# Patient Record
Sex: FEMALE | Race: OTHER | Employment: FULL TIME | URBAN - METROPOLITAN AREA
[De-identification: names, ages, dates, MRNs, and addresses within clinical notes are randomized per-mention and may not be internally consistent; named-entity substitution may affect disease eponyms.]

---

## 2023-06-21 ENCOUNTER — APPOINTMENT (OUTPATIENT)
Dept: GENERAL RADIOLOGY | Age: 33
End: 2023-06-21
Attending: EMERGENCY MEDICINE
Payer: COMMERCIAL

## 2023-06-21 ENCOUNTER — HOSPITAL ENCOUNTER (EMERGENCY)
Age: 33
Discharge: HOME | End: 2023-06-21
Attending: EMERGENCY MEDICINE
Payer: COMMERCIAL

## 2023-06-21 VITALS
SYSTOLIC BLOOD PRESSURE: 118 MMHG | BODY MASS INDEX: 39.31 KG/M2 | DIASTOLIC BLOOD PRESSURE: 68 MMHG | OXYGEN SATURATION: 98 % | RESPIRATION RATE: 16 BRPM | HEIGHT: 59 IN | HEART RATE: 72 BPM | WEIGHT: 195 LBS | TEMPERATURE: 98 F

## 2023-06-21 DIAGNOSIS — R07.89 CHEST PAIN, ATYPICAL: Primary | ICD-10-CM

## 2023-06-21 LAB
ALBUMIN SERPL-MCNC: 3.5 G/DL (ref 3.4–5)
ALBUMIN/GLOB SERPL: 0.9 {RATIO} (ref 1–2)
ALP LIVER SERPL-CCNC: 60 U/L
ALT SERPL-CCNC: 49 U/L
ANION GAP SERPL CALC-SCNC: 3 MMOL/L (ref 0–18)
AST SERPL-CCNC: 23 U/L (ref 15–37)
BASOPHILS # BLD AUTO: 0.05 X10(3) UL (ref 0–0.2)
BASOPHILS NFR BLD AUTO: 0.5 %
BILIRUB SERPL-MCNC: 0.2 MG/DL (ref 0.1–2)
BUN BLD-MCNC: 5 MG/DL (ref 7–18)
CALCIUM BLD-MCNC: 8.4 MG/DL (ref 8.5–10.1)
CHLORIDE SERPL-SCNC: 106 MMOL/L (ref 98–112)
CO2 SERPL-SCNC: 26 MMOL/L (ref 21–32)
CREAT BLD-MCNC: 0.74 MG/DL
D DIMER PPP FEU-MCNC: <0.27 UG/ML FEU (ref ?–0.5)
EOSINOPHIL # BLD AUTO: 0.16 X10(3) UL (ref 0–0.7)
EOSINOPHIL NFR BLD AUTO: 1.7 %
ERYTHROCYTE [DISTWIDTH] IN BLOOD BY AUTOMATED COUNT: 14 %
GFR SERPLBLD BASED ON 1.73 SQ M-ARVRAT: 109 ML/MIN/1.73M2 (ref 60–?)
GLOBULIN PLAS-MCNC: 3.9 G/DL (ref 2.8–4.4)
GLUCOSE BLD-MCNC: 114 MG/DL (ref 70–99)
HCT VFR BLD AUTO: 33.1 %
HGB BLD-MCNC: 10.9 G/DL
IMM GRANULOCYTES # BLD AUTO: 0.03 X10(3) UL (ref 0–1)
IMM GRANULOCYTES NFR BLD: 0.3 %
LYMPHOCYTES # BLD AUTO: 3.36 X10(3) UL (ref 1–4)
LYMPHOCYTES NFR BLD AUTO: 36.5 %
MCH RBC QN AUTO: 26.8 PG (ref 26–34)
MCHC RBC AUTO-ENTMCNC: 32.9 G/DL (ref 31–37)
MCV RBC AUTO: 81.3 FL
MONOCYTES # BLD AUTO: 0.41 X10(3) UL (ref 0.1–1)
MONOCYTES NFR BLD AUTO: 4.5 %
NEUTROPHILS # BLD AUTO: 5.19 X10 (3) UL (ref 1.5–7.7)
NEUTROPHILS # BLD AUTO: 5.19 X10(3) UL (ref 1.5–7.7)
NEUTROPHILS NFR BLD AUTO: 56.5 %
OSMOLALITY SERPL CALC.SUM OF ELEC: 278 MOSM/KG (ref 275–295)
PLATELET # BLD AUTO: 379 10(3)UL (ref 150–450)
POTASSIUM SERPL-SCNC: 3.9 MMOL/L (ref 3.5–5.1)
PROT SERPL-MCNC: 7.4 G/DL (ref 6.4–8.2)
RBC # BLD AUTO: 4.07 X10(6)UL
SODIUM SERPL-SCNC: 135 MMOL/L (ref 136–145)
TROPONIN I HIGH SENSITIVITY: 5 NG/L
WBC # BLD AUTO: 9.2 X10(3) UL (ref 4–11)

## 2023-06-21 PROCEDURE — 99285 EMERGENCY DEPT VISIT HI MDM: CPT

## 2023-06-21 PROCEDURE — 36415 COLL VENOUS BLD VENIPUNCTURE: CPT

## 2023-06-21 PROCEDURE — 71045 X-RAY EXAM CHEST 1 VIEW: CPT | Performed by: EMERGENCY MEDICINE

## 2023-06-21 PROCEDURE — 99284 EMERGENCY DEPT VISIT MOD MDM: CPT

## 2023-06-21 PROCEDURE — 85379 FIBRIN DEGRADATION QUANT: CPT | Performed by: EMERGENCY MEDICINE

## 2023-06-21 PROCEDURE — 84484 ASSAY OF TROPONIN QUANT: CPT | Performed by: EMERGENCY MEDICINE

## 2023-06-21 PROCEDURE — 85025 COMPLETE CBC W/AUTO DIFF WBC: CPT | Performed by: EMERGENCY MEDICINE

## 2023-06-21 PROCEDURE — 93010 ELECTROCARDIOGRAM REPORT: CPT

## 2023-06-21 PROCEDURE — 93005 ELECTROCARDIOGRAM TRACING: CPT

## 2023-06-21 PROCEDURE — 80053 COMPREHEN METABOLIC PANEL: CPT | Performed by: EMERGENCY MEDICINE

## 2023-06-21 RX ORDER — ESCITALOPRAM OXALATE 10 MG/1
10 TABLET ORAL DAILY
COMMUNITY

## 2023-06-21 NOTE — ED INITIAL ASSESSMENT (HPI)
Pt states one week ago felt anxious and had left upper chest pain , constant achy chest pain that radiates to left arm.  Feels sob, denies travel

## 2023-06-22 LAB
ATRIAL RATE: 91 BPM
P AXIS: 36 DEGREES
P-R INTERVAL: 150 MS
Q-T INTERVAL: 362 MS
QRS DURATION: 76 MS
QTC CALCULATION (BEZET): 445 MS
R AXIS: 39 DEGREES
T AXIS: 27 DEGREES
VENTRICULAR RATE: 91 BPM

## 2024-10-16 ENCOUNTER — APPOINTMENT (OUTPATIENT)
Dept: CT IMAGING | Age: 34
End: 2024-10-16
Attending: PHYSICIAN ASSISTANT
Payer: COMMERCIAL

## 2024-10-16 ENCOUNTER — OFFICE VISIT (OUTPATIENT)
Dept: FAMILY MEDICINE CLINIC | Facility: CLINIC | Age: 34
End: 2024-10-16
Payer: COMMERCIAL

## 2024-10-16 ENCOUNTER — HOSPITAL ENCOUNTER (EMERGENCY)
Age: 34
Discharge: HOME | End: 2024-10-16
Attending: EMERGENCY MEDICINE
Payer: COMMERCIAL

## 2024-10-16 VITALS
HEART RATE: 78 BPM | BODY MASS INDEX: 41.73 KG/M2 | SYSTOLIC BLOOD PRESSURE: 118 MMHG | RESPIRATION RATE: 17 BRPM | TEMPERATURE: 99 F | DIASTOLIC BLOOD PRESSURE: 67 MMHG | WEIGHT: 207 LBS | OXYGEN SATURATION: 95 % | HEIGHT: 59 IN

## 2024-10-16 VITALS
WEIGHT: 207.63 LBS | BODY MASS INDEX: 42 KG/M2 | OXYGEN SATURATION: 96 % | HEART RATE: 84 BPM | DIASTOLIC BLOOD PRESSURE: 72 MMHG | TEMPERATURE: 99 F | SYSTOLIC BLOOD PRESSURE: 134 MMHG

## 2024-10-16 DIAGNOSIS — R22.1 LOCALIZED SWELLING, MASS OR LUMP OF NECK: Primary | ICD-10-CM

## 2024-10-16 DIAGNOSIS — R59.1 LYMPHADENOPATHY: Primary | ICD-10-CM

## 2024-10-16 LAB
ALBUMIN SERPL-MCNC: 3.3 G/DL (ref 3.4–5)
ALBUMIN/GLOB SERPL: 0.8 {RATIO} (ref 1–2)
ALP LIVER SERPL-CCNC: 63 U/L
ALT SERPL-CCNC: 43 U/L
ANION GAP SERPL CALC-SCNC: 4 MMOL/L (ref 0–18)
AST SERPL-CCNC: 17 U/L (ref 15–37)
BASOPHILS # BLD AUTO: 0.05 X10(3) UL (ref 0–0.2)
BASOPHILS NFR BLD AUTO: 0.5 %
BILIRUB SERPL-MCNC: 0.2 MG/DL (ref 0.1–2)
BUN BLD-MCNC: 8 MG/DL (ref 9–23)
CALCIUM BLD-MCNC: 9.1 MG/DL (ref 8.5–10.1)
CHLORIDE SERPL-SCNC: 105 MMOL/L (ref 98–112)
CO2 SERPL-SCNC: 28 MMOL/L (ref 21–32)
CREAT BLD-MCNC: 0.82 MG/DL
EGFRCR SERPLBLD CKD-EPI 2021: 96 ML/MIN/1.73M2 (ref 60–?)
EOSINOPHIL # BLD AUTO: 0.26 X10(3) UL (ref 0–0.7)
EOSINOPHIL NFR BLD AUTO: 2.7 %
ERYTHROCYTE [DISTWIDTH] IN BLOOD BY AUTOMATED COUNT: 15.5 %
GLOBULIN PLAS-MCNC: 4.1 G/DL (ref 2.8–4.4)
GLUCOSE BLD-MCNC: 105 MG/DL (ref 70–99)
HCT VFR BLD AUTO: 32.9 %
HGB BLD-MCNC: 10.7 G/DL
IMM GRANULOCYTES # BLD AUTO: 0.03 X10(3) UL (ref 0–1)
IMM GRANULOCYTES NFR BLD: 0.3 %
LYMPHOCYTES # BLD AUTO: 3.57 X10(3) UL (ref 1–4)
LYMPHOCYTES NFR BLD AUTO: 37.1 %
MCH RBC QN AUTO: 25.7 PG (ref 26–34)
MCHC RBC AUTO-ENTMCNC: 32.5 G/DL (ref 31–37)
MCV RBC AUTO: 79.1 FL
MONOCYTES # BLD AUTO: 0.56 X10(3) UL (ref 0.1–1)
MONOCYTES NFR BLD AUTO: 5.8 %
NEUTROPHILS # BLD AUTO: 5.14 X10 (3) UL (ref 1.5–7.7)
NEUTROPHILS # BLD AUTO: 5.14 X10(3) UL (ref 1.5–7.7)
NEUTROPHILS NFR BLD AUTO: 53.6 %
OSMOLALITY SERPL CALC.SUM OF ELEC: 283 MOSM/KG (ref 275–295)
PLATELET # BLD AUTO: 400 10(3)UL (ref 150–450)
POTASSIUM SERPL-SCNC: 3.8 MMOL/L (ref 3.5–5.1)
PROT SERPL-MCNC: 7.4 G/DL (ref 6.4–8.2)
RBC # BLD AUTO: 4.16 X10(6)UL
SODIUM SERPL-SCNC: 137 MMOL/L (ref 136–145)
WBC # BLD AUTO: 9.6 X10(3) UL (ref 4–11)

## 2024-10-16 PROCEDURE — 70491 CT SOFT TISSUE NECK W/DYE: CPT | Performed by: PHYSICIAN ASSISTANT

## 2024-10-16 PROCEDURE — 85025 COMPLETE CBC W/AUTO DIFF WBC: CPT | Performed by: PHYSICIAN ASSISTANT

## 2024-10-16 PROCEDURE — 99284 EMERGENCY DEPT VISIT MOD MDM: CPT

## 2024-10-16 PROCEDURE — 99215 OFFICE O/P EST HI 40 MIN: CPT | Performed by: PHYSICIAN ASSISTANT

## 2024-10-16 PROCEDURE — 80053 COMPREHEN METABOLIC PANEL: CPT | Performed by: PHYSICIAN ASSISTANT

## 2024-10-16 PROCEDURE — 36415 COLL VENOUS BLD VENIPUNCTURE: CPT

## 2024-10-16 RX ORDER — IBUPROFEN 800 MG/1
1 TABLET, FILM COATED ORAL EVERY 8 HOURS PRN
COMMUNITY

## 2024-10-16 NOTE — PROGRESS NOTES
CHIEF COMPLAINT:     Chief Complaint   Patient presents with    Lump     On left side of neck for 3 weeks, possible increase in size. Painful.  Pt was seen at urgent care on 10/12 and was prescribe ABX for 10 days. DX swelling at lymph nodes.         HPI:   Lila Ahn is a 33 year old female who presents with complaints of swelling to the left side of the neck for 3 weeks.  The patient reports the area is painful.  The patient denies fever, URI symptoms, sore throat, and fatigue.  The patient was evaluated at another immediate care on 10/12/24 and was started on Augmentin for cervical adenitis.  The patient reports no improvement and feels it has actually become larger.      Current Outpatient Medications   Medication Sig Dispense Refill    escitalopram 10 MG Oral Tab Take 1 tablet (10 mg total) by mouth daily.        History reviewed. No pertinent past medical history.   Social History:  Social History     Socioeconomic History    Marital status: Single   Tobacco Use    Smoking status: Never    Smokeless tobacco: Never     Social Drivers of Health      Received from [facility]        REVIEW OF SYSTEMS:   GENERAL: Denies fever, chills,weight change, decreased appetite  SKIN: Denies rashes, skin wounds or ulcers.  EYES: Denies blurred vision or double vision  HENT: Denies congestion, rhinorrhea, sore throat or ear pain  CHEST: Denies chest pain, or palpitations  LUNGS: Denies shortness of breath, cough, or wheezing  GI: Denies abdominal pain, N/V/C/D.   MUSCULOSKELETAL: no arthralgia or swollen joints  LYMPH: See HPI  NEURO: Denies headaches or lightheadedness      EXAM:   /72   Pulse 84   Temp 98.6 °F (37 °C) (Temporal)   Wt 207 lb 9.6 oz (94.2 kg)   LMP 08/25/2024 (Approximate)   SpO2 96%   BMI 41.93 kg/m²   GENERAL: well developed, well nourished,in no apparent distress, cooperative   SKIN: no rashes, nosuspicious lesions, no abnormal pigmentation  HEAD: atraumatic,  normocephalic  EYES: EOM intact, PERRL.  Conjunctiva normal.  Cornea clear.  Lid margins normal.  No active drainage.  EARS: Right TM normal, no bulging, no retraction, no fluid, bony landmarks normal.  Left TM normal, no bulging, no retraction, no fluid, bony landmarks normal.    NOSE: nostrils patent, no discharge, nasal mucosa pink and not inflamed.  No sinus tenderness.  THROAT: oral mucosa pink, moist and intact. No visible dental caries. Posterior pharynx without erythema or exudates.  NECK:  Left anterior neck with ill defined area of swelling roughly 3 cm in size.  TTP.  No overlying erythema.   LUNGS: clear to auscultation bilaterally, no wheezes or rhonchi. Breathing is non labored.  CARDIO: RRR without murmur  GI: No visible scars, or masses. BS's present x4. No palpable masses or hepatosplenomegaly.  Non tender.  No guarding or rebound tenderness  EXTREMITIES: no cyanosis, clubbing or edema.  Homans NEG.  Dorsalis Pedis 2+.  LYMPH:  No lymphadenopathy.    NEURO: A&Ox3.  CN II-XII intact.  No focal deficits.  Coordination and Gait normal.  Kernig and Brudzinski's are negative.      ASSESSMENT AND PLAN:     ASSESSMENT:  Encounter Diagnosis   Name Primary?    Localized swelling, mass or lump of neck Yes       PLAN:    Patient Instructions   To [facility]

## 2024-10-16 NOTE — ED INITIAL ASSESSMENT (HPI)
Pt with \"lump\" to left side of neck x3 weeks. States she feels like it's grown in size since the start. Was at UC last weekend and prescribed Amox. No other recent illnesses reported.

## 2024-10-17 NOTE — ED PROVIDER NOTES
I have seen and examined the patient and agree with the assessment and plan as outlined in the documentation by the physician's assistant. I provided a substantive portion of care for this patient. I personally performed the Medical Decision Making for this encounter.  The patient presents with lump on the left side of her neck.  She has now had some discomfort.  Says that she was at an urgent care 5 days ago placed on antibiotics.  No specific presenting illness prior to.  Physical exam shows enlarged inflamed lymph node left side neck.  Full range of motion the neck is noted.  Airway is patent.  CT scan was performed and showed a soft tissue solid structure in the neck 1.7 x 1.4 x 2.1 cm.  Likely represents an inflamed lymph node.  Mild inflammatory stranding noted along the periphery extending superficial and deep to the left platysma.  No organized regional fluid collections.  She cannot take anti-inflammatory medications because of her history of nephrotic syndrome.  Continue with Tylenol for pain control.  Continue with antibiotic therapy.  Follow-up as an outpatient with general surgery and primary care service.  She may need biopsy if she has no resolution of the symptomology.

## 2024-10-17 NOTE — ED PROVIDER NOTES
Patient Seen in: [facility] In [place]      History     Chief Complaint   Patient presents with    Lump Mass     Stated Complaint: Painful lump on L side of neck that was noticed by pt. 3 weeks ago, pt on antib*    Subjective:   HPI      33-year-old female.  Medical history of nephrotic syndrome.  3 weeks prior to arrival patient first developed 2 palpable protuberances to the left mandibular line region.  She had no other symptoms at that time.  One of the lesions resolved.  The second persisted and began to worsen.  She was seen at a urgent care 5 days prior to arrival and placed on oral antibiotics for potential infected lymph node.  Despite this, the lesion has continued to grow in size.  Just today, low-grade concurrent nasal congestion.  Otherwise she denies any systemic fever, chills or malaise.  No voice change or cough.  No difficulty breathing or swallowing.    Objective:     Past Medical History:    Nephrotic syndrome              Past Surgical History:   Procedure Laterality Date    Hc  section level i      Removal gallbladder                  Social History     Socioeconomic History    Marital status: Single   Tobacco Use    Smoking status: Never    Smokeless tobacco: Never   Vaping Use    Vaping status: Never Used   Substance and Sexual Activity    Alcohol use: Yes     Comment: socially    Drug use: Never     Social Drivers of Health      [facility]                  Physical Exam     ED Triage Vitals [10/16/24 1849]   /78   Pulse 83   Resp 16   Temp 98.4 °F (36.9 °C)   Temp src Oral   SpO2 99 %   O2 Device None (Room air)       Current Vitals:   Vital Signs  BP: 131/78  Pulse: 83  Resp: 16  Temp: 98.4 °F (36.9 °C)  Temp src: Oral    Oxygen Therapy  SpO2: 99 %  O2 Device: None (Room air)        Physical Exam  Gen: Well appearing, well groomed, alert and aware x 3  Neck: Supple, full range of motion, no thyromegaly or  lymphadenopathy.  Eye examination: EOMs are intact, normal conjunctival  ENT: Moderate palpable fullness to the left submandibular region.  location more suggestive of lymph node versus salivary gland.  Roughly 3 cm in size.  No erythema or induration.  No voice change.  Swelling  intact.  No acute distress.  No trismus or drooling  Lung: No distress, RR, no retraction, breath sounds are clear bilaterally  Cardio: Regular rate and rhythm, normal S1-S2, no murmur appreciable  Skin: No sign of trauma, Skin warm and dry, no induration or sign of infection.  No rash noted      ED Course     Labs Reviewed   CBC WITH DIFFERENTIAL WITH PLATELET - Abnormal; Notable for the following components:       Result Value    HGB 10.7 (*)     HCT 32.9 (*)     MCV 79.1 (*)     MCH 25.7 (*)     All other components within normal limits   COMP METABOLIC PANEL (14) - Abnormal; Notable for the following components:    Glucose 105 (*)     BUN 8 (*)     Albumin 3.3 (*)     A/G Ratio 0.8 (*)     All other components within normal limits   RAINBOW DRAW LAVENDER   RAINBOW DRAW LIGHT GREEN        CT SOFT TISSUE OF NECK(CONTRAST ONLY) (CPT=70491)    Result Date: 10/16/2024  PROCEDURE:  CT SOFT TISSUE OF NECK(CONTRAST ONLY) (CPT=70491)  COMPARISON:  None.  INDICATIONS:  Painful lump on L side of neck that was noticed by pt. 3 weeks ago, pt on antibiotics for lump from urgent care this past Saturday  TECHNIQUE:  IV contrast-enhanced multislice CT scanning is performed through the neck soft tissues during administration of nonionic contrast.  Dose reduction techniques were used. Dose information is transmitted to the ACR (American College of Radiology) NRDR (National Radiology Data Registry) which includes the Dose Index Registry.  PATIENT STATED HISTORY:(As transcribed by Technologist)  Pt complains of L side lump on her neck that is painful   CONTRAST USED:  50cc of Isovue 370  FINDINGS:  Within the left mid neck is a solid structure  measuring 1.7 x 1.4 x 2.1 cm, interposed between the platysma and proximal muscle belly of the sternocleidomastoid (series 2, image 39, series 601, image 37).  Mild inflammatory stranding noted along its periphery extending superficial and deep to the platysma..  Additionally, there are enlarged regional lymph nodes encompassing the left level 1 and 2 stations measuring up to 1.3 cm in short axis.  Few additional borderline caliber lymph nodes identified  throughout the neck bilaterally.  Moderate mucosal thickening of the left nasopharynx.  Oropharynx and oral cavity are within normal limits.  Parotid and submandibular glands are within normal limits.  Hypopharynx, larynx, and subglottic airway are normal.  No high-grade vascular stenoses or occlusions.  Parapharyngeal fat planes are preserved.  The visualized intracranial contents and paranasal sinuses are within normal limits.  No acute abnormality of the visualized superior thorax.             CONCLUSION:   Solid structure within the soft tissues of the left mid neck (1.7 x 1.4 x 2.1 cm), as described above.  This likely represents an inflamed lymph node.  Mild inflammatory stranding noted along its periphery extending superficial and deep to the left platysma.  No organized regional fluid collections.  Few additional reactive lymph nodes of the left neck, as above.   LOCATION:  [facility]    Dictated by (CST): Flavio Ruvalcaba MD on 10/16/2024 at 9:45 PM     Finalized by (CST): Flavio Ruvalcaba MD on 10/16/2024 at 9:53 PM                 MDM        Moderate palpable fullness to the left submandibular region.  location more suggestive of lymph node versus salivary gland.  Roughly 3 cm in size.  No erythema or induration.  No voice change.  Swelling  intact.  No acute distress.  No trismus or drooling    CBC, CMP.  CT soft tissue neck    My supervising physician was involved in the management of this patient.    CONCLUSION:   Solid structure within the soft tissues of  the left mid neck (1.7 x 1.4 x 2.1 cm), as described above.  This likely represents an inflamed lymph node.  Mild inflammatory stranding noted along its periphery extending superficial and deep to the left platysma.  No organized regional fluid collections.  Few additional reactive lymph nodes of the left neck, as above.   LOCATION:  [facility]    Dictated by (CST): Flavio Ruvalcaba MD on 10/16/2024 at 9:45 PM     Finalized by (CST): Flavio Ruvalcaba MD on 10/16/2024 at 9:53 PM        CT as above.  Patient will be referred to [facility] for consideration of biopsy.  She has only taken 2 dosages of the Augmentin.  She was given clear instructions for any infusion of her airway, difficulty swallowing or other worsening to immediately return to the ER.      Medical Decision Making      Disposition and Plan     Clinical Impression:  1. Lymphadenopathy         Disposition:  There is no disposition on file for this visit.  There is no disposition time on file for this visit.    Follow-up:  Michael Medrano MD  [address] [facility] [address]  [phone]    Follow up            Medications Prescribed:  Current Discharge Medication List              Supplementary Documentation:

## 2024-10-17 NOTE — DISCHARGE INSTRUCTIONS
Continue antibiotic.  For any difficulty breathing or swallowing immediately return to the ER.  Otherwise, follow-up with general surgery for consideration of biopsy